# Patient Record
Sex: MALE | Race: WHITE | Employment: FULL TIME | URBAN - METROPOLITAN AREA
[De-identification: names, ages, dates, MRNs, and addresses within clinical notes are randomized per-mention and may not be internally consistent; named-entity substitution may affect disease eponyms.]

---

## 2017-08-14 ENCOUNTER — TELEPHONE (OUTPATIENT)
Dept: FAMILY MEDICINE CLINIC | Facility: CLINIC | Age: 57
End: 2017-08-14

## 2017-08-14 NOTE — TELEPHONE ENCOUNTER
C/o sore throat for last 4 weeks had sharp pain to Left side of throat that has resolved now has dizzy ness when he lays down and moves this got better but is still having dizzy ness given an appointment with Dr Leopoldo Mckinney for tomorrow

## 2017-08-15 ENCOUNTER — OFFICE VISIT (OUTPATIENT)
Dept: FAMILY MEDICINE CLINIC | Facility: CLINIC | Age: 57
End: 2017-08-15

## 2017-08-15 VITALS
BODY MASS INDEX: 27.44 KG/M2 | HEART RATE: 80 BPM | TEMPERATURE: 99 F | RESPIRATION RATE: 14 BRPM | HEIGHT: 75.75 IN | DIASTOLIC BLOOD PRESSURE: 70 MMHG | SYSTOLIC BLOOD PRESSURE: 130 MMHG | WEIGHT: 223 LBS

## 2017-08-15 DIAGNOSIS — H81.10 BPPV (BENIGN PAROXYSMAL POSITIONAL VERTIGO), UNSPECIFIED LATERALITY: Primary | ICD-10-CM

## 2017-08-15 PROCEDURE — 99213 OFFICE O/P EST LOW 20 MIN: CPT | Performed by: FAMILY MEDICINE

## 2017-08-15 NOTE — PROGRESS NOTES
Patient presents with:  Dizziness: x4 weeks w/dizziness especially in the mornin and with quick movements. Pt states had some naueous w/dizziness. HPI:   Tristen Ordonez is a 62year old male who presents to the office for dizziness 4 weeks.   Getting a

## 2018-01-25 ENCOUNTER — TELEPHONE (OUTPATIENT)
Dept: FAMILY MEDICINE CLINIC | Facility: CLINIC | Age: 58
End: 2018-01-25

## 2018-01-25 DIAGNOSIS — Z13.220 LIPID SCREENING: Primary | ICD-10-CM

## 2018-01-25 DIAGNOSIS — Z12.5 PROSTATE CANCER SCREENING: ICD-10-CM

## 2018-01-25 NOTE — TELEPHONE ENCOUNTER
Pt made physical appt Jan 31st and needs blood work order sent to [address] [place] [address].

## 2018-01-28 LAB
ALBUMIN/GLOBULIN RATIO: 1.5 (CALC) (ref 1–2.5)
ALBUMIN: 4.2 G/DL (ref 3.6–5.1)
ALKALINE PHOSPHATASE: 46 U/L (ref 40–115)
ALT: 17 U/L (ref 9–46)
AST: 21 U/L (ref 10–35)
BILIRUBIN, TOTAL: 0.7 MG/DL (ref 0.2–1.2)
BUN: 15 MG/DL (ref 7–25)
CALCIUM: 9.4 MG/DL (ref 8.6–10.3)
CARBON DIOXIDE: 30 MMOL/L (ref 20–31)
CHLORIDE: 102 MMOL/L (ref 98–110)
CHOL/HDLC RATIO: 3.1 (CALC)
CHOLESTEROL, TOTAL: 219 MG/DL
CREATININE: 1.21 MG/DL (ref 0.7–1.33)
EGFR IF AFRICN AM: 77 ML/MIN/1.73M2
EGFR IF NONAFRICN AM: 66 ML/MIN/1.73M2
GLOBULIN: 2.8 G/DL (CALC) (ref 1.9–3.7)
GLUCOSE: 94 MG/DL (ref 65–99)
HDL CHOLESTEROL: 70 MG/DL
LDL-CHOLESTEROL: 134 MG/DL (CALC)
NON-HDL CHOLESTEROL: 149 MG/DL (CALC)
POTASSIUM: 4.5 MMOL/L (ref 3.5–5.3)
PROTEIN, TOTAL: 7 G/DL (ref 6.1–8.1)
PSA, TOTAL: 2.1 NG/ML
SODIUM: 138 MMOL/L (ref 135–146)
TRIGLYCERIDES: 60 MG/DL

## 2018-01-30 ENCOUNTER — OFFICE VISIT (OUTPATIENT)
Dept: FAMILY MEDICINE CLINIC | Facility: CLINIC | Age: 58
End: 2018-01-30

## 2018-01-30 VITALS
DIASTOLIC BLOOD PRESSURE: 70 MMHG | BODY MASS INDEX: 26.89 KG/M2 | HEART RATE: 72 BPM | RESPIRATION RATE: 16 BRPM | HEIGHT: 76 IN | WEIGHT: 220.81 LBS | SYSTOLIC BLOOD PRESSURE: 108 MMHG | TEMPERATURE: 98 F

## 2018-01-30 DIAGNOSIS — S00.93XS CONTUSION OF HEAD, UNSPECIFIED PART OF HEAD, SEQUELA: ICD-10-CM

## 2018-01-30 DIAGNOSIS — H81.10 BPPV (BENIGN PAROXYSMAL POSITIONAL VERTIGO), UNSPECIFIED LATERALITY: ICD-10-CM

## 2018-01-30 DIAGNOSIS — Z00.00 ANNUAL PHYSICAL EXAM: Primary | ICD-10-CM

## 2018-01-30 PROCEDURE — 99396 PREV VISIT EST AGE 40-64: CPT | Performed by: FAMILY MEDICINE

## 2018-01-30 RX ORDER — CHLORAL HYDRATE 500 MG
1 CAPSULE ORAL DAILY
COMMUNITY
End: 2021-03-10

## 2018-01-30 RX ORDER — MULTIVIT-MIN/IRON FUM/FOLIC AC 7.5 MG-4
1 TABLET ORAL DAILY
COMMUNITY
End: 2021-03-10

## 2018-01-30 NOTE — PROGRESS NOTES
Patient presents with:  Physical: had neck injury - tripped and hit head on wall right after Thanksgiving      HPI:   Jon Clayton is a 62year old male who presents for a complete physical exam.     Last colonoscopy:  2013. Due again 2023.     Last PSA ([facility].) 12/3/2013   • Squamous cell carcinoma 7/13    right forearm      Past Surgical History:  3/18/2013: COLONOSCOPY      Comment: diverticulosis   Family History   Problem Relation Age of Onset   • Pacemaker Father    • Ear Problems Father    • Ear Probl and symmetric  Neurologic: Alert and oriented X 3, normal strength and tone. Normal symmetric reflexes.  Normal coordination and gait      ASSESSMENT AND PLAN:     Tony Lopez was seen in the office today:  had concerns including Physical (had neck inju

## 2019-03-25 ENCOUNTER — TELEPHONE (OUTPATIENT)
Dept: FAMILY MEDICINE CLINIC | Facility: CLINIC | Age: 59
End: 2019-03-25

## 2019-03-25 DIAGNOSIS — Z13.228 SCREENING FOR METABOLIC DISORDER: ICD-10-CM

## 2019-03-25 DIAGNOSIS — Z12.5 SCREENING FOR PROSTATE CANCER: ICD-10-CM

## 2019-03-25 DIAGNOSIS — Z13.220 SCREENING CHOLESTEROL LEVEL: ICD-10-CM

## 2019-03-25 DIAGNOSIS — Z00.00 BLOOD TESTS FOR ROUTINE GENERAL PHYSICAL EXAMINATION: Primary | ICD-10-CM

## 2019-03-25 NOTE — TELEPHONE ENCOUNTER
Please enter lab orders for the patient's upcoming physical appointment. Physical scheduled:    Your appointments     Date & Time Appointment Department [facility])    Apr 10, 2019  7:30 AM CDT Physical - Established Patient with MD Christina Josue

## 2019-04-10 ENCOUNTER — OFFICE VISIT (OUTPATIENT)
Dept: FAMILY MEDICINE CLINIC | Facility: CLINIC | Age: 59
End: 2019-04-10
Payer: COMMERCIAL

## 2019-04-10 VITALS
TEMPERATURE: 98 F | BODY MASS INDEX: 27.39 KG/M2 | SYSTOLIC BLOOD PRESSURE: 114 MMHG | HEIGHT: 75.75 IN | HEART RATE: 72 BPM | WEIGHT: 222.63 LBS | DIASTOLIC BLOOD PRESSURE: 78 MMHG | RESPIRATION RATE: 16 BRPM

## 2019-04-10 DIAGNOSIS — Z00.00 ANNUAL PHYSICAL EXAM: Primary | ICD-10-CM

## 2019-04-10 DIAGNOSIS — R29.898 NECK TIGHTNESS: ICD-10-CM

## 2019-04-10 PROCEDURE — 99396 PREV VISIT EST AGE 40-64: CPT | Performed by: FAMILY MEDICINE

## 2019-04-10 NOTE — PROGRESS NOTES
Patient presents with:  Physical: annual exam and review recent labs  Neck Pain: Still having pain after a fall a year ago     HPI:   Rosana Burleson is a 62year old male who presents for a complete physical exam.     Last colonoscopy:  3/2013.   Due again upper back   • Melanoma ([facility] 11/12    .42mm mid back   • Melanoma in situ of back ([facility] 12/3/2013   • Squamous cell carcinoma 7/13    right forearm      Past Surgical History:   Procedure Laterality Date   • COLONOSCOPY  3/18/2013    diverticulosis      F soft, non-tender; bowel sounds normal; no masses,  no organomegaly  Extremities: extremities normal, atraumatic, no cyanosis or edema  Pulses: 2+ and symmetric  Neurologic: Alert and oriented X 3, normal strength and tone. Normal symmetric reflexes.  Normal

## 2019-11-17 ENCOUNTER — WALK IN (OUTPATIENT)
Dept: URGENT CARE | Age: 59
End: 2019-11-17

## 2019-11-17 DIAGNOSIS — Z23 INFLUENZA VACCINATION ADMINISTERED AT CURRENT VISIT: Primary | ICD-10-CM

## 2019-11-17 PROCEDURE — 90471 IMMUNIZATION ADMIN: CPT | Performed by: NURSE PRACTITIONER

## 2019-11-17 PROCEDURE — 90686 IIV4 VACC NO PRSV 0.5 ML IM: CPT | Performed by: NURSE PRACTITIONER

## 2019-12-16 ENCOUNTER — TELEPHONE (OUTPATIENT)
Dept: FAMILY MEDICINE CLINIC | Facility: CLINIC | Age: 59
End: 2019-12-16

## 2019-12-16 NOTE — TELEPHONE ENCOUNTER
FYI: Patient called to schedule appointment with Dr Emma Tate, states thinks has heart arhythmia, offered another provider to get in sooner, patient declined due to his availability, scheduled for 12/30 with Dr Emma Tate.

## 2019-12-17 ENCOUNTER — HOSPITAL ENCOUNTER (EMERGENCY)
Facility: HOSPITAL | Age: 59
Discharge: HOME | End: 2019-12-17
Attending: EMERGENCY MEDICINE
Payer: COMMERCIAL

## 2019-12-17 ENCOUNTER — APPOINTMENT (OUTPATIENT)
Dept: GENERAL RADIOLOGY | Facility: HOSPITAL | Age: 59
End: 2019-12-17
Attending: EMERGENCY MEDICINE
Payer: COMMERCIAL

## 2019-12-17 VITALS
RESPIRATION RATE: 16 BRPM | HEIGHT: 76 IN | HEART RATE: 59 BPM | DIASTOLIC BLOOD PRESSURE: 82 MMHG | WEIGHT: 215 LBS | OXYGEN SATURATION: 98 % | BODY MASS INDEX: 26.18 KG/M2 | SYSTOLIC BLOOD PRESSURE: 145 MMHG | TEMPERATURE: 97 F

## 2019-12-17 DIAGNOSIS — R00.2 PALPITATIONS: Primary | ICD-10-CM

## 2019-12-17 PROCEDURE — 96360 HYDRATION IV INFUSION INIT: CPT

## 2019-12-17 PROCEDURE — 85610 PROTHROMBIN TIME: CPT | Performed by: EMERGENCY MEDICINE

## 2019-12-17 PROCEDURE — 71045 X-RAY EXAM CHEST 1 VIEW: CPT | Performed by: EMERGENCY MEDICINE

## 2019-12-17 PROCEDURE — 99285 EMERGENCY DEPT VISIT HI MDM: CPT

## 2019-12-17 PROCEDURE — 93005 ELECTROCARDIOGRAM TRACING: CPT

## 2019-12-17 PROCEDURE — 84443 ASSAY THYROID STIM HORMONE: CPT | Performed by: EMERGENCY MEDICINE

## 2019-12-17 PROCEDURE — 96361 HYDRATE IV INFUSION ADD-ON: CPT

## 2019-12-17 PROCEDURE — 85025 COMPLETE CBC W/AUTO DIFF WBC: CPT | Performed by: EMERGENCY MEDICINE

## 2019-12-17 PROCEDURE — 80053 COMPREHEN METABOLIC PANEL: CPT | Performed by: EMERGENCY MEDICINE

## 2019-12-17 PROCEDURE — 93010 ELECTROCARDIOGRAM REPORT: CPT

## 2019-12-17 PROCEDURE — 85730 THROMBOPLASTIN TIME PARTIAL: CPT | Performed by: EMERGENCY MEDICINE

## 2019-12-17 PROCEDURE — 84484 ASSAY OF TROPONIN QUANT: CPT | Performed by: EMERGENCY MEDICINE

## 2019-12-17 RX ORDER — METOPROLOL SUCCINATE 25 MG/1
25 TABLET, EXTENDED RELEASE ORAL DAILY
Qty: 14 TABLET | Refills: 0 | Status: SHIPPED | OUTPATIENT
Start: 2019-12-17 | End: 2019-12-20

## 2019-12-17 NOTE — TELEPHONE ENCOUNTER
Patient called back started in [facility] Oct for this has not seen anybody for this not constant chest is pounding at times had heaviness notices it at night when getting up to go to bathroom has hard beat to go along with regular beat can last up to 1 min gets

## 2019-12-18 ENCOUNTER — OFFICE VISIT (OUTPATIENT)
Dept: FAMILY MEDICINE CLINIC | Facility: CLINIC | Age: 59
End: 2019-12-18
Payer: COMMERCIAL

## 2019-12-18 VITALS
BODY MASS INDEX: 27.16 KG/M2 | HEIGHT: 76 IN | DIASTOLIC BLOOD PRESSURE: 74 MMHG | HEART RATE: 64 BPM | WEIGHT: 223 LBS | SYSTOLIC BLOOD PRESSURE: 126 MMHG | RESPIRATION RATE: 16 BRPM | TEMPERATURE: 98 F

## 2019-12-18 DIAGNOSIS — I49.1 PAC (PREMATURE ATRIAL CONTRACTION): Primary | ICD-10-CM

## 2019-12-18 PROCEDURE — 99214 OFFICE O/P EST MOD 30 MIN: CPT | Performed by: FAMILY MEDICINE

## 2019-12-18 PROCEDURE — 93000 ELECTROCARDIOGRAM COMPLETE: CPT | Performed by: FAMILY MEDICINE

## 2019-12-18 NOTE — PROGRESS NOTES
Patient presents with:  ER F/U: Pt was seen in ER on 12/17/19 for palpitations     HPI:   Thu Wright is a 61year old male who presents to the office for eval of palpitations. Was having these for a few months. Discussed with Fredis.   He looked including caffeine, stress, lack of sleep, we discussed his recent labs work and lack of anemia, thyroid, kidney disease as good signs. Would recommend he start the metoprolol to see if it does help. Discussed SE.   Plan to see EP specialist in future  Wh

## 2019-12-18 NOTE — ED PROVIDER NOTES
Patient Seen in: [facility] Emergency Department      History   Patient presents with:  Arrythmia/Palpitations    Stated Complaint: palpitations    HPI    Martitakelly Cantu is a pleasant 49-year-old male coming with complaints of palpitations.   He has been ha components:       Result Value    Glucose 101 (*)     BUN 21 (*)     All other components within normal limits   TROPONIN I - Normal   PROTHROMBIN TIME (PT) - Normal   PTT, ACTIVATED - Normal   TSH W REFLEX TO FREE T4 - Normal   CBC WITH DIFFERENTIAL WITH these medications    Metoprolol Succinate ER 25 MG Oral Tablet 24 Hr  Take 1 tablet (25 mg total) by mouth daily for 14 days.   Qty: 14 tablet Refills: 0

## 2019-12-18 NOTE — ED INITIAL ASSESSMENT (HPI)
Patient to ER with complaint of palpations, reports \"I feel an extra heavy beat,\" without any chest pain or DAVID. Breathing is unlabored, even and regular at this time. Reports palpations X 8 weeks.

## 2019-12-18 NOTE — ED NOTES
Received patient from Kathe Dyson, [place]. Patient resting comfortably in stretcher at this time. He remains on continuous cardiac monitoring and pulse oximetry. NSR noted. Patient denies any pain or complaint at this time. Will continue to monitor.

## 2019-12-20 ENCOUNTER — TELEPHONE (OUTPATIENT)
Dept: CARDIOLOGY | Age: 59
End: 2019-12-20

## 2019-12-20 NOTE — TELEPHONE ENCOUNTER
Patient requesting refill on Metoprolol, will run out on New Year's and wants to make sure that he is covered.  Please send to [facility]

## 2019-12-20 NOTE — TELEPHONE ENCOUNTER
Left message on answering machine to call triage. LOV 12/18/19. Please ask pt if Metoprolol is helping his symptoms.

## 2019-12-21 RX ORDER — METOPROLOL SUCCINATE 25 MG/1
25 TABLET, EXTENDED RELEASE ORAL DAILY
Qty: 30 TABLET | Refills: 2 | Status: SHIPPED | OUTPATIENT
Start: 2019-12-21 | End: 2020-03-10

## 2020-01-17 ENCOUNTER — TELEPHONE (OUTPATIENT)
Dept: CARDIOLOGY | Age: 60
End: 2020-01-17

## 2020-01-20 ENCOUNTER — TELEPHONE (OUTPATIENT)
Dept: CARDIOLOGY | Age: 60
End: 2020-01-20

## 2020-01-20 RX ORDER — METOPROLOL SUCCINATE 25 MG/1
25 TABLET, EXTENDED RELEASE ORAL DAILY
Refills: 2 | COMMUNITY
Start: 2019-12-29 | End: 2021-08-18

## 2020-01-20 RX ORDER — MULTIVIT-MIN/IRON FUM/FOLIC AC 7.5 MG-4
1 TABLET ORAL DAILY
COMMUNITY
End: 2020-08-12 | Stop reason: ALTCHOICE

## 2020-01-20 RX ORDER — CHLORAL HYDRATE 500 MG
1 CAPSULE ORAL DAILY
COMMUNITY
End: 2020-08-12 | Stop reason: ALTCHOICE

## 2020-01-22 ENCOUNTER — OFFICE VISIT (OUTPATIENT)
Dept: CARDIOLOGY | Age: 60
End: 2020-01-22

## 2020-01-22 ENCOUNTER — APPOINTMENT (OUTPATIENT)
Dept: CARDIOLOGY | Age: 60
End: 2020-01-22

## 2020-01-22 VITALS
BODY MASS INDEX: 27.03 KG/M2 | HEIGHT: 76 IN | SYSTOLIC BLOOD PRESSURE: 122 MMHG | WEIGHT: 222 LBS | DIASTOLIC BLOOD PRESSURE: 74 MMHG

## 2020-01-22 DIAGNOSIS — R00.2 PALPITATIONS: Primary | ICD-10-CM

## 2020-01-22 PROCEDURE — 93000 ELECTROCARDIOGRAM COMPLETE: CPT | Performed by: INTERNAL MEDICINE

## 2020-01-22 PROCEDURE — 99215 OFFICE O/P EST HI 40 MIN: CPT | Performed by: INTERNAL MEDICINE

## 2020-01-22 SDOH — HEALTH STABILITY: PHYSICAL HEALTH: ON AVERAGE, HOW MANY DAYS PER WEEK DO YOU ENGAGE IN MODERATE TO STRENUOUS EXERCISE (LIKE A BRISK WALK)?: 3 DAYS

## 2020-01-22 SDOH — HEALTH STABILITY: PHYSICAL HEALTH: ON AVERAGE, HOW MANY MINUTES DO YOU ENGAGE IN EXERCISE AT THIS LEVEL?: 90 MIN

## 2020-01-22 ASSESSMENT — PATIENT HEALTH QUESTIONNAIRE - PHQ9
SUM OF ALL RESPONSES TO PHQ9 QUESTIONS 1 AND 2: 0
1. LITTLE INTEREST OR PLEASURE IN DOING THINGS: NOT AT ALL
SUM OF ALL RESPONSES TO PHQ9 QUESTIONS 1 AND 2: 0
2. FEELING DOWN, DEPRESSED OR HOPELESS: NOT AT ALL

## 2020-01-28 ENCOUNTER — ANCILLARY PROCEDURE (OUTPATIENT)
Dept: CARDIOLOGY | Age: 60
End: 2020-01-28
Attending: INTERNAL MEDICINE

## 2020-01-28 DIAGNOSIS — R00.2 PALPITATIONS: ICD-10-CM

## 2020-01-28 PROCEDURE — 93306 TTE W/DOPPLER COMPLETE: CPT | Performed by: INTERNAL MEDICINE

## 2020-01-29 ENCOUNTER — TELEPHONE (OUTPATIENT)
Dept: CARDIOLOGY | Age: 60
End: 2020-01-29

## 2020-02-28 ENCOUNTER — TELEPHONE (OUTPATIENT)
Dept: FAMILY MEDICINE CLINIC | Facility: CLINIC | Age: 60
End: 2020-02-28

## 2020-02-28 DIAGNOSIS — Z13.0 SCREENING FOR BLOOD DISEASE: Primary | ICD-10-CM

## 2020-02-28 DIAGNOSIS — Z13.220 SCREENING FOR LIPID DISORDERS: ICD-10-CM

## 2020-02-28 DIAGNOSIS — Z13.228 SCREENING FOR METABOLIC DISORDER: ICD-10-CM

## 2020-02-28 DIAGNOSIS — Z13.29 SCREENING FOR THYROID DISORDER: ICD-10-CM

## 2020-02-28 DIAGNOSIS — Z12.5 SCREENING FOR PROSTATE CANCER: ICD-10-CM

## 2020-02-28 NOTE — TELEPHONE ENCOUNTER
Please enter lab orders for the patient's upcoming physical appointment. Physical scheduled:    Your appointments     Date & Time Appointment Department [facility])    Mar 10, 2020  7:30 AM CDT Physical - Established with Kyara Bhardwaj  [facility]

## 2020-02-28 NOTE — TELEPHONE ENCOUNTER
LM for patient to let him know his fasting orders, 10-12 hours, water only have been entered to [facility] and to have them prior to appt if possible.

## 2020-03-06 LAB
ABSOLUTE BASOPHILS: 80 CELLS/UL (ref 0–200)
ABSOLUTE EOSINOPHILS: 132 CELLS/UL (ref 15–500)
ABSOLUTE LYMPHOCYTES: 1833 CELLS/UL (ref 850–3900)
ABSOLUTE MONOCYTES: 353 CELLS/UL (ref 200–950)
ABSOLUTE NEUTROPHILS: 2303 CELLS/UL (ref 1500–7800)
ALBUMIN/GLOBULIN RATIO: 1.8 (CALC) (ref 1–2.5)
ALBUMIN: 4.3 G/DL (ref 3.6–5.1)
ALKALINE PHOSPHATASE: 40 U/L (ref 35–144)
ALT: 14 U/L (ref 9–46)
AST: 18 U/L (ref 10–35)
BASOPHILS: 1.7 %
BILIRUBIN, TOTAL: 0.8 MG/DL (ref 0.2–1.2)
BUN: 21 MG/DL (ref 7–25)
CALCIUM: 9.7 MG/DL (ref 8.6–10.3)
CARBON DIOXIDE: 33 MMOL/L (ref 20–32)
CHLORIDE: 101 MMOL/L (ref 98–110)
CHOL/HDLC RATIO: 3.7 (CALC)
CHOLESTEROL, TOTAL: 223 MG/DL
CREATININE: 1.1 MG/DL (ref 0.7–1.33)
EGFR IF AFRICN AM: 85 ML/MIN/1.73M2
EGFR IF NONAFRICN AM: 73 ML/MIN/1.73M2
EOSINOPHILS: 2.8 %
GLOBULIN: 2.4 G/DL (CALC) (ref 1.9–3.7)
GLUCOSE: 87 MG/DL (ref 65–99)
HDL CHOLESTEROL: 60 MG/DL
HEMATOCRIT: 43.2 % (ref 38.5–50)
HEMOGLOBIN: 15 G/DL (ref 13.2–17.1)
LDL-CHOLESTEROL: 144 MG/DL (CALC)
LYMPHOCYTES: 39 %
MCH: 31.5 PG (ref 27–33)
MCHC: 34.7 G/DL (ref 32–36)
MCV: 90.8 FL (ref 80–100)
MONOCYTES: 7.5 %
MPV: 10.3 FL (ref 7.5–12.5)
NEUTROPHILS: 49 %
NON-HDL CHOLESTEROL: 163 MG/DL (CALC)
PLATELET COUNT: 216 THOUSAND/UL (ref 140–400)
POTASSIUM: 5.2 MMOL/L (ref 3.5–5.3)
PROTEIN, TOTAL: 6.7 G/DL (ref 6.1–8.1)
PSA, TOTAL: 3.5 NG/ML
RDW: 12.6 % (ref 11–15)
RED BLOOD CELL COUNT: 4.76 MILLION/UL (ref 4.2–5.8)
SODIUM: 139 MMOL/L (ref 135–146)
TRIGLYCERIDES: 83 MG/DL
TSH W/REFLEX TO FT4: 0.88 MIU/L (ref 0.4–4.5)
WHITE BLOOD CELL COUNT: 4.7 THOUSAND/UL (ref 3.8–10.8)

## 2020-03-10 ENCOUNTER — OFFICE VISIT (OUTPATIENT)
Dept: FAMILY MEDICINE CLINIC | Facility: CLINIC | Age: 60
End: 2020-03-10
Payer: COMMERCIAL

## 2020-03-10 VITALS
HEART RATE: 56 BPM | HEIGHT: 76 IN | DIASTOLIC BLOOD PRESSURE: 70 MMHG | TEMPERATURE: 98 F | WEIGHT: 221 LBS | RESPIRATION RATE: 16 BRPM | BODY MASS INDEX: 26.91 KG/M2 | SYSTOLIC BLOOD PRESSURE: 122 MMHG

## 2020-03-10 DIAGNOSIS — I49.1 PAC (PREMATURE ATRIAL CONTRACTION): ICD-10-CM

## 2020-03-10 DIAGNOSIS — Z00.00 ANNUAL PHYSICAL EXAM: Primary | ICD-10-CM

## 2020-03-10 PROCEDURE — 99396 PREV VISIT EST AGE 40-64: CPT | Performed by: FAMILY MEDICINE

## 2020-03-10 RX ORDER — METOPROLOL SUCCINATE 25 MG/1
25 TABLET, EXTENDED RELEASE ORAL DAILY
Qty: 90 TABLET | Refills: 3 | Status: SHIPPED | OUTPATIENT
Start: 2020-03-10 | End: 2021-03-10

## 2020-03-10 RX ORDER — METRONIDAZOLE 500 MG/1
500 TABLET ORAL 3 TIMES DAILY
Qty: 21 TABLET | Refills: 0 | Status: SHIPPED | OUTPATIENT
Start: 2020-03-10 | End: 2021-03-10

## 2020-03-10 NOTE — PROGRESS NOTES
Patient presents with: Well Adult: Annual physical, labs completed     HPI:   Lesly Lazcano is a 61year old male who presents for a complete physical exam.     Last colonoscopy:  UTD.  2/18/2013. Repeat 10 yrs - 2023. Last PSA:  3.5, up some.   Susan Tejada forearm      Past Surgical History:   Procedure Laterality Date   • COLONOSCOPY  3/18/2013    diverticulosis      Family History   Problem Relation Age of Onset   • Pacemaker Father    • Ear Problems Father    • Ear Problems Brother    • Ear Problems Mater Normal coordination and gait     ASSESSMENT AND PLAN:     Yovanny Roche was seen in the office today:  had concerns including Well Adult (Annual physical, labs completed). 1. Annual physical exam  Overall well  Planning to do 3-mo hiking trip.   Given

## 2020-07-29 ENCOUNTER — APPOINTMENT (OUTPATIENT)
Dept: CARDIOLOGY | Age: 60
End: 2020-07-29

## 2020-08-12 ENCOUNTER — OFFICE VISIT (OUTPATIENT)
Dept: CARDIOLOGY | Age: 60
End: 2020-08-12

## 2020-08-12 VITALS
HEART RATE: 68 BPM | HEIGHT: 76 IN | SYSTOLIC BLOOD PRESSURE: 108 MMHG | WEIGHT: 228 LBS | DIASTOLIC BLOOD PRESSURE: 72 MMHG | BODY MASS INDEX: 27.76 KG/M2

## 2020-08-12 DIAGNOSIS — I49.1 ATRIAL ECTOPY: Primary | ICD-10-CM

## 2020-08-12 PROCEDURE — 99215 OFFICE O/P EST HI 40 MIN: CPT | Performed by: INTERNAL MEDICINE

## 2020-08-12 SDOH — HEALTH STABILITY: PHYSICAL HEALTH: ON AVERAGE, HOW MANY DAYS PER WEEK DO YOU ENGAGE IN MODERATE TO STRENUOUS EXERCISE (LIKE A BRISK WALK)?: 3 DAYS

## 2020-08-12 SDOH — HEALTH STABILITY: PHYSICAL HEALTH: ON AVERAGE, HOW MANY MINUTES DO YOU ENGAGE IN EXERCISE AT THIS LEVEL?: 90 MIN

## 2020-08-12 ASSESSMENT — PATIENT HEALTH QUESTIONNAIRE - PHQ9
SUM OF ALL RESPONSES TO PHQ9 QUESTIONS 1 AND 2: 0
2. FEELING DOWN, DEPRESSED OR HOPELESS: NOT AT ALL
1. LITTLE INTEREST OR PLEASURE IN DOING THINGS: NOT AT ALL
CLINICAL INTERPRETATION OF PHQ2 SCORE: NO FURTHER SCREENING NEEDED
CLINICAL INTERPRETATION OF PHQ9 SCORE: NO FURTHER SCREENING NEEDED
SUM OF ALL RESPONSES TO PHQ9 QUESTIONS 1 AND 2: 0

## 2021-02-10 ENCOUNTER — TELEPHONE (OUTPATIENT)
Dept: FAMILY MEDICINE CLINIC | Facility: CLINIC | Age: 61
End: 2021-02-10

## 2021-02-10 DIAGNOSIS — N40.1 BPH ASSOCIATED WITH NOCTURIA: Primary | ICD-10-CM

## 2021-02-10 DIAGNOSIS — Z13.220 LIPID SCREENING: ICD-10-CM

## 2021-02-10 DIAGNOSIS — Z12.5 SCREENING FOR MALIGNANT NEOPLASM OF PROSTATE: ICD-10-CM

## 2021-02-10 DIAGNOSIS — R35.1 BPH ASSOCIATED WITH NOCTURIA: Primary | ICD-10-CM

## 2021-02-10 DIAGNOSIS — Z13.0 SCREENING FOR BLOOD DISEASE: ICD-10-CM

## 2021-02-10 NOTE — TELEPHONE ENCOUNTER
Labs have been ordered. Please get them fasting prior to the appt.   thanks  [facility]

## 2021-02-10 NOTE — TELEPHONE ENCOUNTER
Please enter lab orders for the patient's upcoming physical appointment. Physical scheduled:    Your appointments     Date & Time Appointment Department [facility])    Mar 10, 2021  8:30 AM CST Physical - Established with Gabrielle Avendano MD [address] [place] [address]

## 2021-03-05 LAB
ABSOLUTE BASOPHILS: 78 CELLS/UL (ref 0–200)
ABSOLUTE EOSINOPHILS: 207 CELLS/UL (ref 15–500)
ABSOLUTE LYMPHOCYTES: 1923 CELLS/UL (ref 850–3900)
ABSOLUTE MONOCYTES: 419 CELLS/UL (ref 200–950)
ABSOLUTE NEUTROPHILS: 1973 CELLS/UL (ref 1500–7800)
ALBUMIN/GLOBULIN RATIO: 1.7 (CALC) (ref 1–2.5)
ALBUMIN: 4.2 G/DL (ref 3.6–5.1)
ALKALINE PHOSPHATASE: 49 U/L (ref 35–144)
ALT: 15 U/L (ref 9–46)
AST: 19 U/L (ref 10–35)
BASOPHILS: 1.7 %
BILIRUBIN, TOTAL: 0.7 MG/DL (ref 0.2–1.2)
BUN: 15 MG/DL (ref 7–25)
CALCIUM: 9.7 MG/DL (ref 8.6–10.3)
CARBON DIOXIDE: 31 MMOL/L (ref 20–32)
CHLORIDE: 103 MMOL/L (ref 98–110)
CHOL/HDLC RATIO: 3.9 (CALC)
CHOLESTEROL, TOTAL: 233 MG/DL
CREATININE: 1.13 MG/DL (ref 0.7–1.25)
EGFR IF AFRICN AM: 81 ML/MIN/1.73M2
EGFR IF NONAFRICN AM: 70 ML/MIN/1.73M2
EOSINOPHILS: 4.5 %
GLOBULIN: 2.5 G/DL (CALC) (ref 1.9–3.7)
GLUCOSE: 94 MG/DL (ref 65–99)
HDL CHOLESTEROL: 59 MG/DL
HEMATOCRIT: 43.2 % (ref 38.5–50)
HEMOGLOBIN: 14.7 G/DL (ref 13.2–17.1)
LDL-CHOLESTEROL: 152 MG/DL (CALC)
LYMPHOCYTES: 41.8 %
MCH: 30.9 PG (ref 27–33)
MCHC: 34 G/DL (ref 32–36)
MCV: 90.9 FL (ref 80–100)
MONOCYTES: 9.1 %
MPV: 10.7 FL (ref 7.5–12.5)
NEUTROPHILS: 42.9 %
NON-HDL CHOLESTEROL: 174 MG/DL (CALC)
PLATELET COUNT: 234 THOUSAND/UL (ref 140–400)
POTASSIUM: 4.4 MMOL/L (ref 3.5–5.3)
PROTEIN, TOTAL: 6.7 G/DL (ref 6.1–8.1)
RDW: 12.6 % (ref 11–15)
RED BLOOD CELL COUNT: 4.75 MILLION/UL (ref 4.2–5.8)
SODIUM: 139 MMOL/L (ref 135–146)
TOTAL PSA: 2.3 NG/ML
TRIGLYCERIDES: 107 MG/DL
WHITE BLOOD CELL COUNT: 4.6 THOUSAND/UL (ref 3.8–10.8)

## 2021-03-10 ENCOUNTER — OFFICE VISIT (OUTPATIENT)
Dept: FAMILY MEDICINE CLINIC | Facility: CLINIC | Age: 61
End: 2021-03-10
Payer: COMMERCIAL

## 2021-03-10 VITALS
HEART RATE: 64 BPM | WEIGHT: 233.38 LBS | HEIGHT: 76 IN | OXYGEN SATURATION: 98 % | SYSTOLIC BLOOD PRESSURE: 134 MMHG | DIASTOLIC BLOOD PRESSURE: 62 MMHG | RESPIRATION RATE: 16 BRPM | TEMPERATURE: 98 F | BODY MASS INDEX: 28.42 KG/M2

## 2021-03-10 DIAGNOSIS — M25.551 RIGHT HIP PAIN: ICD-10-CM

## 2021-03-10 DIAGNOSIS — Z00.00 ANNUAL PHYSICAL EXAM: ICD-10-CM

## 2021-03-10 DIAGNOSIS — E78.5 HYPERLIPIDEMIA LDL GOAL <130: Primary | ICD-10-CM

## 2021-03-10 PROCEDURE — 99396 PREV VISIT EST AGE 40-64: CPT | Performed by: FAMILY MEDICINE

## 2021-03-10 PROCEDURE — 3075F SYST BP GE 130 - 139MM HG: CPT | Performed by: FAMILY MEDICINE

## 2021-03-10 PROCEDURE — 3078F DIAST BP <80 MM HG: CPT | Performed by: FAMILY MEDICINE

## 2021-03-10 PROCEDURE — 3008F BODY MASS INDEX DOCD: CPT | Performed by: FAMILY MEDICINE

## 2021-03-10 NOTE — PROGRESS NOTES
Patient presents with:  Physical: Annual  Hip Pain: Mobility  Erectile Dysfuntion: Urinary and Changes     HPI:   Karin Moulton is a 61year old male who presents for a complete physical exam.     Last colonoscopy:  2/18/2013.   Repeat 10 yrs - 2023  Last Actinic keratosis    • Cancer ([facility]    • Dysplastic nevus 4/15    right upper back   • Melanoma ([facility] 11/12    .42mm mid back   • Melanoma in situ of back ([facility] 12/3/2013   • Squamous cell carcinoma 7/13    right forearm      Past Surgical History:   Proce no masses,  no organomegaly  Extremities: extremities normal, atraumatic, no cyanosis or edema  Pulses: 2+ and symmetric  Neurologic: Alert and oriented X 3, normal strength and tone. Normal symmetric reflexes.  Normal coordination and gait   MSK: non tende

## 2021-08-18 ENCOUNTER — OFFICE VISIT (OUTPATIENT)
Dept: CARDIOLOGY | Age: 61
End: 2021-08-18

## 2021-08-18 VITALS
DIASTOLIC BLOOD PRESSURE: 74 MMHG | HEART RATE: 65 BPM | SYSTOLIC BLOOD PRESSURE: 116 MMHG | BODY MASS INDEX: 27.52 KG/M2 | HEIGHT: 76 IN | WEIGHT: 226 LBS

## 2021-08-18 DIAGNOSIS — I49.3 PVC (PREMATURE VENTRICULAR CONTRACTION): Primary | ICD-10-CM

## 2021-08-18 DIAGNOSIS — I49.3 PVC (PREMATURE VENTRICULAR CONTRACTION): ICD-10-CM

## 2021-08-18 PROCEDURE — 99215 OFFICE O/P EST HI 40 MIN: CPT | Performed by: INTERNAL MEDICINE

## 2021-08-18 PROCEDURE — 93000 ELECTROCARDIOGRAM COMPLETE: CPT | Performed by: INTERNAL MEDICINE

## 2021-08-18 ASSESSMENT — PATIENT HEALTH QUESTIONNAIRE - PHQ9
1. LITTLE INTEREST OR PLEASURE IN DOING THINGS: NOT AT ALL
2. FEELING DOWN, DEPRESSED OR HOPELESS: NOT AT ALL
CLINICAL INTERPRETATION OF PHQ9 SCORE: NO FURTHER SCREENING NEEDED
SUM OF ALL RESPONSES TO PHQ9 QUESTIONS 1 AND 2: 0
CLINICAL INTERPRETATION OF PHQ2 SCORE: NO FURTHER SCREENING NEEDED
SUM OF ALL RESPONSES TO PHQ9 QUESTIONS 1 AND 2: 0

## 2021-08-20 ENCOUNTER — TELEPHONE (OUTPATIENT)
Dept: FAMILY MEDICINE CLINIC | Facility: CLINIC | Age: 61
End: 2021-08-20

## 2021-08-20 NOTE — TELEPHONE ENCOUNTER
PT states R  eye has been bloodshot all week. Feels uncomfortable moving it around. Feels inflamed. Wants to know if he needs to see eye doctor or if he needs to see provider.

## 2022-08-24 ENCOUNTER — OFFICE VISIT (OUTPATIENT)
Dept: CARDIOLOGY | Age: 62
End: 2022-08-24

## 2022-08-24 VITALS
SYSTOLIC BLOOD PRESSURE: 132 MMHG | WEIGHT: 224 LBS | HEART RATE: 70 BPM | DIASTOLIC BLOOD PRESSURE: 82 MMHG | BODY MASS INDEX: 27.27 KG/M2

## 2022-08-24 DIAGNOSIS — I49.3 PVC (PREMATURE VENTRICULAR CONTRACTION): Primary | ICD-10-CM

## 2022-08-24 PROCEDURE — 99215 OFFICE O/P EST HI 40 MIN: CPT | Performed by: INTERNAL MEDICINE

## 2022-08-24 ASSESSMENT — PATIENT HEALTH QUESTIONNAIRE - PHQ9
1. LITTLE INTEREST OR PLEASURE IN DOING THINGS: NOT AT ALL
2. FEELING DOWN, DEPRESSED OR HOPELESS: NOT AT ALL
CLINICAL INTERPRETATION OF PHQ2 SCORE: NO FURTHER SCREENING NEEDED
SUM OF ALL RESPONSES TO PHQ9 QUESTIONS 1 AND 2: 0
SUM OF ALL RESPONSES TO PHQ9 QUESTIONS 1 AND 2: 0

## 2022-09-02 ENCOUNTER — TELEPHONE (OUTPATIENT)
Dept: FAMILY MEDICINE CLINIC | Facility: CLINIC | Age: 62
End: 2022-09-02

## 2022-09-02 DIAGNOSIS — R35.1 BPH ASSOCIATED WITH NOCTURIA: ICD-10-CM

## 2022-09-02 DIAGNOSIS — Z12.5 SCREENING FOR MALIGNANT NEOPLASM OF PROSTATE: ICD-10-CM

## 2022-09-02 DIAGNOSIS — E78.5 HYPERLIPIDEMIA LDL GOAL <130: Primary | ICD-10-CM

## 2022-09-02 DIAGNOSIS — N40.1 BPH ASSOCIATED WITH NOCTURIA: ICD-10-CM

## 2022-09-02 NOTE — TELEPHONE ENCOUNTER
Please enter lab orders for the patient's upcoming physical appointment. Physical scheduled: Your appointments     Date & Time Appointment Department [facility])    Sep 12, 2022  7:30 AM CDT Physical - Established with MD [facility] [employer] [address], [place]  ([address])            [place] [address] [id number]         Preferred lab: [facility]     The patient has been notified to complete fasting labs prior to their physical appointment.

## 2022-09-09 LAB
ALBUMIN/GLOBULIN RATIO: 1.5 (CALC) (ref 1–2.5)
ALBUMIN: 4.3 G/DL (ref 3.6–5.1)
ALKALINE PHOSPHATASE: 46 U/L (ref 35–144)
ALT: 15 U/L (ref 9–46)
AST: 20 U/L (ref 10–35)
BILIRUBIN, TOTAL: 0.7 MG/DL (ref 0.2–1.2)
BUN: 18 MG/DL (ref 7–25)
CALCIUM: 9.6 MG/DL (ref 8.6–10.3)
CARBON DIOXIDE: 30 MMOL/L (ref 20–32)
CHLORIDE: 103 MMOL/L (ref 98–110)
CHOL/HDLC RATIO: 3.4 (CALC)
CHOLESTEROL, TOTAL: 223 MG/DL
CREATININE: 1.22 MG/DL (ref 0.7–1.35)
EGFR: 67 ML/MIN/1.73M2
GLOBULIN: 2.8 G/DL (CALC) (ref 1.9–3.7)
GLUCOSE: 89 MG/DL (ref 65–99)
HDL CHOLESTEROL: 65 MG/DL
LDL-CHOLESTEROL: 139 MG/DL (CALC)
NON-HDL CHOLESTEROL: 158 MG/DL (CALC)
POTASSIUM: 4.7 MMOL/L (ref 3.5–5.3)
PROTEIN, TOTAL: 7.1 G/DL (ref 6.1–8.1)
SODIUM: 139 MMOL/L (ref 135–146)
TOTAL PSA: 4 NG/ML
TRIGLYCERIDES: 91 MG/DL

## 2022-09-12 ENCOUNTER — OFFICE VISIT (OUTPATIENT)
Dept: FAMILY MEDICINE CLINIC | Facility: CLINIC | Age: 62
End: 2022-09-12
Payer: COMMERCIAL

## 2022-09-12 VITALS
RESPIRATION RATE: 16 BRPM | OXYGEN SATURATION: 98 % | SYSTOLIC BLOOD PRESSURE: 122 MMHG | TEMPERATURE: 99 F | WEIGHT: 223 LBS | HEART RATE: 80 BPM | HEIGHT: 75.75 IN | BODY MASS INDEX: 27.44 KG/M2 | DIASTOLIC BLOOD PRESSURE: 72 MMHG

## 2022-09-12 DIAGNOSIS — E78.5 HYPERLIPIDEMIA LDL GOAL <130: ICD-10-CM

## 2022-09-12 DIAGNOSIS — R97.20 ELEVATED PSA, LESS THAN 10 NG/ML: ICD-10-CM

## 2022-09-12 DIAGNOSIS — J34.89 SINUS PRESSURE: ICD-10-CM

## 2022-09-12 DIAGNOSIS — Z00.00 ANNUAL PHYSICAL EXAM: Primary | ICD-10-CM

## 2022-09-12 PROCEDURE — 3078F DIAST BP <80 MM HG: CPT | Performed by: FAMILY MEDICINE

## 2022-09-12 PROCEDURE — 99396 PREV VISIT EST AGE 40-64: CPT | Performed by: FAMILY MEDICINE

## 2022-09-12 PROCEDURE — 3008F BODY MASS INDEX DOCD: CPT | Performed by: FAMILY MEDICINE

## 2022-09-12 PROCEDURE — 3074F SYST BP LT 130 MM HG: CPT | Performed by: FAMILY MEDICINE

## 2022-10-25 ENCOUNTER — TELEPHONE (OUTPATIENT)
Dept: FAMILY MEDICINE CLINIC | Facility: CLINIC | Age: 62
End: 2022-10-25

## 2022-10-25 RX ORDER — METHYLPREDNISOLONE 4 MG/1
TABLET ORAL
Qty: 21 EACH | Refills: 0 | Status: SHIPPED | OUTPATIENT
Start: 2022-10-25

## 2022-10-25 NOTE — TELEPHONE ENCOUNTER
We did discuss this. Sorry its not making more of a difference. What I would do now is keep it up, and we will add a steroid for a short burst to try to cut down the inflammation here and now  I will call into pharmacy.

## 2022-10-25 NOTE — TELEPHONE ENCOUNTER
Pt wanting to speak to clinical about ongoing sinus issues. Discussed with Dr. Glynn Leone at [facility]. Tried flonase but not helping. Please call back.

## 2022-10-27 ENCOUNTER — TELEPHONE (OUTPATIENT)
Dept: FAMILY MEDICINE CLINIC | Facility: CLINIC | Age: 62
End: 2022-10-27

## 2022-10-27 NOTE — TELEPHONE ENCOUNTER
Pt advised directions are on blister pack. He was able to locate them during our conversation. Patient has no further questions or concerns at this time.

## 2022-10-27 NOTE — TELEPHONE ENCOUNTER
Pt picked up his script from [facility] and no dosage directions on the medication please advise.     Methylprednisolone

## 2022-10-28 LAB — PSA, TOTAL: 3.46 NG/ML

## 2023-04-03 ENCOUNTER — TELEPHONE (OUTPATIENT)
Dept: FAMILY MEDICINE CLINIC | Facility: CLINIC | Age: 63
End: 2023-04-03

## 2023-04-03 DIAGNOSIS — Z12.11 ENCOUNTER FOR SCREENING COLONOSCOPY: Primary | ICD-10-CM

## 2023-04-03 NOTE — TELEPHONE ENCOUNTER
[address] EMERGENCY DEPT  [address] [facility] [address] [phone]    Work/School Note    Date: 10/27/2021    To Whom It May concern:    Brady Sanderson was seen and treated today in the emergency room by the following provider(s):  Attending Provider: Brenton Gallegos MD.      Brady Sanderson may return to work on 10/29/21    Sincerely, Patient is calling he wants a order for a colonoscopy he would like a call back to discuss

## 2023-04-26 PROBLEM — Z12.11 SPECIAL SCREENING FOR MALIGNANT NEOPLASM OF COLON: Status: ACTIVE | Noted: 2023-04-26

## 2023-04-26 PROBLEM — D12.0 BENIGN NEOPLASM OF CECUM: Status: ACTIVE | Noted: 2023-04-26

## 2023-08-22 ENCOUNTER — TELEPHONE (OUTPATIENT)
Dept: FAMILY MEDICINE CLINIC | Facility: CLINIC | Age: 63
End: 2023-08-22

## 2023-08-22 NOTE — TELEPHONE ENCOUNTER
Pt call because came Covid Positive today , cough, running nose , congestion   Pt request a nurse to call back for what to do .                [id number] - Tiffany Duvall, [address] [place] [address] [address], [phone], [address] [address] Phone: [phone] Fax: [phone] Hours: Not open 24 hours

## 2023-08-23 NOTE — TELEPHONE ENCOUNTER
Called and talked to patient told him of My Chart message with information and recommended medications.  Their symptoms started the 18th and are getting better they will continue to isolate and call back if not getting better

## 2024-03-25 ENCOUNTER — TELEPHONE (OUTPATIENT)
Dept: FAMILY MEDICINE CLINIC | Facility: CLINIC | Age: 64
End: 2024-03-25

## 2024-03-25 DIAGNOSIS — Z00.00 LABORATORY EXAM ORDERED AS PART OF ROUTINE GENERAL MEDICAL EXAMINATION: Primary | ICD-10-CM

## 2024-03-25 NOTE — TELEPHONE ENCOUNTER
Future Appointments     Date Time Provider Department Center   4/24/2024  7:30 AM Sven Farrell MD [facility]   5/6/2024  6:30 AM Leo Barron MD [facility]      Please order labs for upcoming Px.    Routed to Dr Farrell

## 2024-03-25 NOTE — TELEPHONE ENCOUNTER
Please enter lab orders for the patient's upcoming physical appointment.     Physical scheduled:   Your appointments       Date & Time Appointment Department ([place])    Apr 24, 2024  7:30 AM CDT Physical - Established with Sven Farrell MD [facility] ([facility])              [facility]  [address]  [facility] [address]  [phone]           Preferred lab: [facility]     The patient has been notified to complete fasting labs prior to their physical appointment.

## 2024-04-07 ENCOUNTER — HOSPITAL ENCOUNTER (OUTPATIENT)
Dept: CT IMAGING | Age: 64
Discharge: HOME | End: 2024-04-07
Attending: FAMILY MEDICINE

## 2024-04-07 DIAGNOSIS — Z13.6 SCREENING FOR HEART DISEASE: ICD-10-CM

## 2024-04-19 LAB
ALBUMIN/GLOBULIN RATIO: 1.5 (CALC) (ref 1–2.5)
ALBUMIN: 4 G/DL (ref 3.6–5.1)
ALKALINE PHOSPHATASE: 46 U/L (ref 35–144)
ALT: 13 U/L (ref 9–46)
AST: 19 U/L (ref 10–35)
BILIRUBIN, TOTAL: 0.6 MG/DL (ref 0.2–1.2)
BUN: 20 MG/DL (ref 7–25)
CALCIUM: 9.6 MG/DL (ref 8.6–10.3)
CARBON DIOXIDE: 29 MMOL/L (ref 20–32)
CHLORIDE: 103 MMOL/L (ref 98–110)
CHOL/HDLC RATIO: 3.3 (CALC)
CHOLESTEROL, TOTAL: 208 MG/DL
CREATININE: 1.07 MG/DL (ref 0.7–1.35)
EGFR: 78 ML/MIN/1.73M2
GLOBULIN: 2.6 G/DL (CALC) (ref 1.9–3.7)
GLUCOSE: 94 MG/DL (ref 65–99)
HDL CHOLESTEROL: 64 MG/DL
HEMATOCRIT: 42.7 % (ref 38.5–50)
HEMOGLOBIN A1C: 5.5 % OF TOTAL HGB
HEMOGLOBIN: 14.2 G/DL (ref 13.2–17.1)
LDL-CHOLESTEROL: 125 MG/DL (CALC)
MCH: 31.1 PG (ref 27–33)
MCHC: 33.3 G/DL (ref 32–36)
MCV: 93.6 FL (ref 80–100)
MPV: 10.5 FL (ref 7.5–12.5)
NON-HDL CHOLESTEROL: 144 MG/DL (CALC)
PLATELET COUNT: 215 THOUSAND/UL (ref 140–400)
POTASSIUM: 5 MMOL/L (ref 3.5–5.3)
PROTEIN, TOTAL: 6.6 G/DL (ref 6.1–8.1)
RDW: 12.8 % (ref 11–15)
RED BLOOD CELL COUNT: 4.56 MILLION/UL (ref 4.2–5.8)
SODIUM: 139 MMOL/L (ref 135–146)
TOTAL PSA: 3.1 NG/ML
TRIGLYCERIDES: 88 MG/DL
TSH W/REFLEX TO FT4: 1.61 MIU/L (ref 0.4–4.5)
WHITE BLOOD CELL COUNT: 3.9 THOUSAND/UL (ref 3.8–10.8)

## 2024-04-24 ENCOUNTER — OFFICE VISIT (OUTPATIENT)
Dept: FAMILY MEDICINE CLINIC | Facility: CLINIC | Age: 64
End: 2024-04-24
Payer: COMMERCIAL

## 2024-04-24 VITALS
SYSTOLIC BLOOD PRESSURE: 120 MMHG | RESPIRATION RATE: 13 BRPM | WEIGHT: 224 LBS | DIASTOLIC BLOOD PRESSURE: 68 MMHG | HEART RATE: 72 BPM | OXYGEN SATURATION: 98 % | BODY MASS INDEX: 27.56 KG/M2 | HEIGHT: 75.75 IN

## 2024-04-24 DIAGNOSIS — Z00.00 ANNUAL PHYSICAL EXAM: Primary | ICD-10-CM

## 2024-04-24 PROBLEM — D12.0 BENIGN NEOPLASM OF CECUM: Status: RESOLVED | Noted: 2023-04-26 | Resolved: 2024-04-24

## 2024-04-24 PROBLEM — Z12.11 SPECIAL SCREENING FOR MALIGNANT NEOPLASM OF COLON: Status: RESOLVED | Noted: 2023-04-26 | Resolved: 2024-04-24

## 2024-04-24 PROCEDURE — 99396 PREV VISIT EST AGE 40-64: CPT | Performed by: FAMILY MEDICINE

## 2024-04-24 PROCEDURE — 96127 BRIEF EMOTIONAL/BEHAV ASSMT: CPT | Performed by: FAMILY MEDICINE

## 2024-04-24 RX ORDER — CICLOPIROX 80 MG/ML
1 SOLUTION TOPICAL NIGHTLY
COMMUNITY
Start: 2024-04-02

## 2024-04-24 NOTE — PROGRESS NOTES
Chief Complaint   Patient presents with    Physical     Patient here for physical       HPI:   Fito Pichardo is a 63 year old male who presents for a complete physical exam.     Last colonoscopy:  4/2023.  Repeat 5 yrs  Last PSA:  3.1  Immunizations: TDaP 2015.      Wt Readings from Last 6 Encounters:   04/24/24 224 lb (101.6 kg)   04/19/23 227 lb (103 kg)   09/12/22 223 lb (101.2 kg)   03/10/21 233 lb 6.4 oz (105.9 kg)   03/10/20 221 lb (100.2 kg)   12/18/19 223 lb (101.2 kg)     Body mass index is 27.45 kg/m².     Chemistry Labs:   Lab Results   Component Value Date/Time    GLU 94 04/18/2024 07:53 AM     04/18/2024 07:53 AM    K 5.0 04/18/2024 07:53 AM     04/18/2024 07:53 AM    CO2 29 04/18/2024 07:53 AM    CREATSERUM 1.07 04/18/2024 07:53 AM    CA 9.6 04/18/2024 07:53 AM    ALB 4.0 04/18/2024 07:53 AM    TP 6.6 04/18/2024 07:53 AM    ALKPHO 46 04/18/2024 07:53 AM    AST 19 04/18/2024 07:53 AM    ALT 13 04/18/2024 07:53 AM    BILT 0.6 04/18/2024 07:53 AM          Cholesterol  (most recent labs)   Lab Results   Component Value Date/Time    CHOLEST 208 (H) 04/18/2024 07:53 AM    HDL 64 04/18/2024 07:53 AM     (H) 04/18/2024 07:53 AM    TRIG 88 04/18/2024 07:53 AM      No results found for: \"PSA\"      Current Outpatient Medications   Medication Sig Dispense Refill    Ciclopirox 8 % External Solution Apply 1 Application topically nightly.        Past Medical History:    Actinic keratosis    Cancer (HCC)    Dysplastic nevus    right upper back    Hearing loss    Hearing aids    Melanoma (HCC)    .42mm mid back    Melanoma in situ of back (HCC)    Squamous cell carcinoma    right forearm    Wears glasses    Reading      Past Surgical History:   Procedure Laterality Date    Colonoscopy  03/18/2013    diverticulosis    Colonoscopy        Family History   Problem Relation Age of Onset    Pacemaker Father     Ear Problems Father     Ear Problems Brother     Ear Problems Maternal Uncle     Allergies Neg      Cancer Neg     Bleeding Disorders Neg     Clotting Disorder Neg     Thyroid disease Neg     Hypertension Neg     Asthma Neg     Arthritis Neg     Diabetes Neg     Anesthesia Problems  Neg       Social History:  Social History     Socioeconomic History    Marital status:    Tobacco Use    Smoking status: Never    Smokeless tobacco: Never   Vaping Use    Vaping status: Never Used   Substance and Sexual Activity    Alcohol use: Yes     Comment: 2 glasses wine and 2 cans of beer once/twice a month    Drug use: No   Other Topics Concern    Caffeine Concern No     Comment: decaf    Exercise Yes     Comment: 2-3 times weekly     Seat Belt Yes     Social Determinants of Health     Physical Activity: Sufficiently Active (8/12/2020)    Received from [email or web address]    Exercise Vital Sign     Days of Exercise per Week: 3 days     Minutes of Exercise per Session: 90 min      Occ: IT.   : yes. Children: 2 - daughter 41, son 33.   Exercise: walking  Diet: balanced.     REVIEW OF SYSTEMS:     All systems reviewed, negative other than noted above.    EXAM:   /68   Pulse 72   Resp 13   Ht 6' 3.75\" (1.924 m)   Wt 224 lb (101.6 kg)   SpO2 98%   BMI 27.45 kg/m²   Body mass index is 27.45 kg/m².     General appearance: alert, appears stated age and cooperative  Eyes: conjunctivae/corneas clear. PERRL, EOM's intact.   Ears: normal TM's and external ear canals both ears  Neck: no adenopathy, no JVD, supple, symmetrical, trachea midline and thyroid not enlarged, symmetric, no tenderness/mass/nodules  Lungs: clear to auscultation bilaterally  Heart: S1, S2 normal, no murmur, click, rub or gallop, regular rate and rhythm  Abdomen: soft, non-tender; bowel sounds normal; no masses,  no organomegaly  Extremities: extremities normal, atraumatic, no cyanosis or edema  Pulses: 2+ and symmetric  Neurologic: Alert and oriented X 3, normal strength and tone. Normal symmetric reflexes.  Normal coordination and gait     ASSESSMENT AND PLAN:     Fito AN Montessuki was seen in the office today:  had concerns including Physical (Patient here for physical ).    1. Annual physical exam  Overall well  Continues to follow healthy diet, exercise.  Lipids improved  CACS shows no signs of plaque on the cardiac arteries  C-scope UTD  PSA reassuring.  Discussed immunizations.       Sven Farrell M.D.   [facility] 3  04/24/24

## 2025-03-28 ENCOUNTER — TELEPHONE (OUTPATIENT)
Dept: FAMILY MEDICINE CLINIC | Facility: CLINIC | Age: 65
End: 2025-03-28

## 2025-03-28 DIAGNOSIS — Z00.00 LABORATORY EXAM ORDERED AS PART OF ROUTINE GENERAL MEDICAL EXAMINATION: Primary | ICD-10-CM

## 2025-03-28 NOTE — TELEPHONE ENCOUNTER
Please enter lab orders for the patient's upcoming physical appointment.    Physical scheduled:   Your appointments       Date & Time Appointment Department ([place])    Apr 29, 2025 9:00 AM CDT Physical - Established with Sven Farrell MD [facility] ([facility])              [facility]  [address]  [facility] [address]  [phone]           Preferred lab: [facility]     The patient has been notified to complete fasting labs prior to their physical appointment.

## 2025-04-11 LAB
ABSOLUTE BASOPHILS: 92 CELLS/UL (ref 0–200)
ABSOLUTE EOSINOPHILS: 342 CELLS/UL (ref 15–500)
ABSOLUTE LYMPHOCYTES: 1882 CELLS/UL (ref 850–3900)
ABSOLUTE MONOCYTES: 423 CELLS/UL (ref 200–950)
ABSOLUTE NEUTROPHILS: 2361 CELLS/UL (ref 1500–7800)
ALBUMIN/GLOBULIN RATIO: 1.5 (CALC) (ref 1–2.5)
ALBUMIN: 4.1 G/DL (ref 3.6–5.1)
ALKALINE PHOSPHATASE: 50 U/L (ref 35–144)
ALT: 13 U/L (ref 9–46)
AST: 19 U/L (ref 10–35)
BASOPHILS: 1.8 %
BILIRUBIN, TOTAL: 0.6 MG/DL (ref 0.2–1.2)
BUN: 16 MG/DL (ref 7–25)
CALCIUM: 9.3 MG/DL (ref 8.6–10.3)
CARBON DIOXIDE: 31 MMOL/L (ref 20–32)
CHLORIDE: 103 MMOL/L (ref 98–110)
CHOL/HDLC RATIO: 3.2 (CALC)
CHOLESTEROL, TOTAL: 208 MG/DL
CREATININE: 0.99 MG/DL (ref 0.7–1.35)
EGFR: 85 ML/MIN/1.73M2
EOSINOPHILS: 6.7 %
GLOBULIN: 2.8 G/DL (CALC) (ref 1.9–3.7)
GLUCOSE: 90 MG/DL (ref 65–99)
HDL CHOLESTEROL: 66 MG/DL
HEMATOCRIT: 43.4 % (ref 38.5–50)
HEMOGLOBIN A1C: 5.5 % OF TOTAL HGB
HEMOGLOBIN: 14.2 G/DL (ref 13.2–17.1)
LDL-CHOLESTEROL: 125 MG/DL (CALC)
LYMPHOCYTES: 36.9 %
MCH: 31 PG (ref 27–33)
MCHC: 32.7 G/DL (ref 32–36)
MCV: 94.8 FL (ref 80–100)
MONOCYTES: 8.3 %
MPV: 10.6 FL (ref 7.5–12.5)
NEUTROPHILS: 46.3 %
NON-HDL CHOLESTEROL: 142 MG/DL (CALC)
PLATELET COUNT: 244 THOUSAND/UL (ref 140–400)
POTASSIUM: 4.2 MMOL/L (ref 3.5–5.3)
PROTEIN, TOTAL: 6.9 G/DL (ref 6.1–8.1)
PSA, TOTAL: 2.59 NG/ML
RDW: 12.8 % (ref 11–15)
RED BLOOD CELL COUNT: 4.58 MILLION/UL (ref 4.2–5.8)
SODIUM: 140 MMOL/L (ref 135–146)
TRIGLYCERIDES: 78 MG/DL
TSH W/REFLEX TO FT4: 1.36 MIU/L (ref 0.4–4.5)
WHITE BLOOD CELL COUNT: 5.1 THOUSAND/UL (ref 3.8–10.8)

## 2025-04-29 ENCOUNTER — OFFICE VISIT (OUTPATIENT)
Dept: FAMILY MEDICINE CLINIC | Facility: CLINIC | Age: 65
End: 2025-04-29
Payer: COMMERCIAL

## 2025-04-29 VITALS
SYSTOLIC BLOOD PRESSURE: 120 MMHG | BODY MASS INDEX: 25.84 KG/M2 | RESPIRATION RATE: 16 BRPM | WEIGHT: 210 LBS | OXYGEN SATURATION: 98 % | HEART RATE: 73 BPM | DIASTOLIC BLOOD PRESSURE: 68 MMHG | HEIGHT: 75.75 IN

## 2025-04-29 DIAGNOSIS — Z00.00 ANNUAL PHYSICAL EXAM: Primary | ICD-10-CM

## 2025-04-29 DIAGNOSIS — H53.9 CHANGE IN VISION: ICD-10-CM

## 2025-04-29 PROCEDURE — 99396 PREV VISIT EST AGE 40-64: CPT | Performed by: FAMILY MEDICINE

## 2025-04-29 NOTE — PROGRESS NOTES
Chief Complaint   Patient presents with    Well Adult     Patient here for physical       HPI:   Fito Pichardo is a 64 year old male who presents for a complete physical exam. Planning to move to [place] end of summer.  Closing/inspection on house this week, then in May.      Last colonoscopy:  4/2023.  Repeat 5 yrs.   Last PSA:  2.59.    Immunizations: TDaP 2025.  Flu UTD.  COVID UTD.      Lipids - remain improved.      Eye - R eye - retina not detached, but some wave to it.  Monitoring with the eye team.  Not progressing. In the peripheral portion of the R eye.  And colors little more muted.      Wt Readings from Last 6 Encounters:   04/29/25 210 lb (95.3 kg)   04/24/24 224 lb (101.6 kg)   04/19/23 227 lb (103 kg)   09/12/22 223 lb (101.2 kg)   03/10/21 233 lb 6.4 oz (105.9 kg)   03/10/20 221 lb (100.2 kg)     Body mass index is 25.73 kg/m².     Chemistry Labs:   Lab Results   Component Value Date/Time    GLU 90 04/10/2025 08:21 AM     04/10/2025 08:21 AM    K 4.2 04/10/2025 08:21 AM     04/10/2025 08:21 AM    CO2 31 04/10/2025 08:21 AM    CREATSERUM 0.99 04/10/2025 08:21 AM    CA 9.3 04/10/2025 08:21 AM    ALB 4.1 04/10/2025 08:21 AM    TP 6.9 04/10/2025 08:21 AM    ALKPHO 50 04/10/2025 08:21 AM    AST 19 04/10/2025 08:21 AM    ALT 13 04/10/2025 08:21 AM    BILT 0.6 04/10/2025 08:21 AM          Cholesterol  (most recent labs)   Lab Results   Component Value Date/Time    CHOLEST 208 (H) 04/10/2025 08:21 AM    HDL 66 04/10/2025 08:21 AM     (H) 04/10/2025 08:21 AM    TRIG 78 04/10/2025 08:21 AM      No results found for: \"PSA\"      Current Medications[1]   Past Medical History[2]   Past Surgical History[3]   Family History[4]   Social History:  Short Social Hx on File[5]     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 .   : yes. Children: 2 - daughter 42.  Son 34 (in [place])  Exercise: regular.    Diet: improved.   Hello fresh.       REVIEW OF SYSTEMS:     All systems reviewed, negative other  than noted above.    EXAM:   /68   Pulse 73   Resp 16   Ht 6' 3.75\" (1.924 m)   Wt 210 lb (95.3 kg)   SpO2 98%   BMI 25.73 kg/m²   Body mass index is 25.73 kg/m².     General appearance: alert, appears stated age and cooperative  Eyes: conjunctivae/corneas clear. PERRL, EOM's intact.   Ears: normal TM's and external ear canals both ears  Neck: no adenopathy, no JVD, supple, symmetrical, trachea midline and thyroid not enlarged, symmetric, no tenderness/mass/nodules  Lungs: clear to auscultation bilaterally  Heart: S1, S2 normal, no murmur, click, rub or gallop, regular rate and rhythm  Abdomen: soft, non-tender; bowel sounds normal; no masses,  no organomegaly  Extremities: extremities normal, atraumatic, no cyanosis or edema  Pulses: 2+ and symmetric  Neurologic: Alert and oriented X 3, normal strength and tone. Normal symmetric reflexes. Normal coordination and gait    ASSESSMENT AND PLAN:     Fito Pichardo was seen in the office today:  had concerns including Well Adult (Patient here for physical ).    1. Annual physical exam  Overall well  Healthy diet and exercise  Lipids, labs remain very well controlled  C-scope UTD  PSA normal with screening labs.  Immunizations updated.     2. Change in vision  In R eye.  Seeing ophtho - a fold or sorts in the retina.  They are monitoring regularly.   Discussed red flags, worrisome changes in vision to look out for.         Moving to [place] at the end of summer.  He has family in the area.       Sven Farrell M.D.   [facility]  04/29/25          Note to patient: The 21 Century Cures Act makes medical notes like these available to patients in the interest of transparency. However, be advised this is a medical document. It is intended as peer to peer communication. It is written in medical language and may contain abbreviations or verbiage that are unfamiliar. It may appear blunt or direct. Medical documents are intended to carry relevant  information, facts as evident, and the clinical opinion of the practitioner.            [1]   Current Outpatient Medications   Medication Sig Dispense Refill    Ciclopirox 8 % External Solution Apply 1 Application topically nightly.     [2]   Past Medical History:   Actinic keratosis    Cancer (HCC)    Dysplastic nevus    right upper back    Hearing loss    Hearing aids    Melanoma (HCC)    .42mm mid back    Melanoma in situ of back (HCC)    Squamous cell carcinoma    right forearm    Wears glasses    Reading   [3]   Past Surgical History:  Procedure Laterality Date    Colonoscopy  03/18/2013    diverticulosis    Colonoscopy     [4]   Family History  Problem Relation Age of Onset    Pacemaker Father     Ear Problems Father     Ear Problems Brother     Ear Problems Maternal Uncle     Allergies Neg     Cancer Neg     Bleeding Disorders Neg     Clotting Disorder Neg     Thyroid disease Neg     Hypertension Neg     Asthma Neg     Arthritis Neg     Diabetes Neg     Anesthesia Problems  Neg    [5]   Social History  Socioeconomic History    Marital status:    Tobacco Use    Smoking status: Never    Smokeless tobacco: Never   Vaping Use    Vaping status: Never Used   Substance and Sexual Activity    Alcohol use: Yes     Comment: 2 glasses wine and 2 cans of beer once/twice a month    Drug use: No   Other Topics Concern    Caffeine Concern No     Comment: decaf    Exercise Yes     Comment: 2-3 times weekly     Seat Belt Yes     Social Drivers of Health     Food Insecurity: No Food Insecurity (4/29/2025)    NCSS - Food Insecurity     Worried About Running Out of Food in the Last Year: No     Ran Out of Food in the Last Year: No   Transportation Needs: No Transportation Needs (4/29/2025)    NCSS - Transportation     Lack of Transportation: No   Housing Stability: Not At Risk (4/29/2025)    NCSS - Housing/Utilities     Has Housing: Yes     Worried About Losing Housing: No     Unable to Get Utilities: No

## (undated) NOTE — ED AVS SNAPSHOT
Radha Burton   MRN: [id number]    Department:  [facility] Emergency Department   Date of Visit:  12/17/2019           Disclosure     Insurance plans vary and the physician(s) referred by the ER may not be covered by your plan.  Please contact yo tell this physician (or your personal doctor if your instructions are to return to your personal doctor) about any new or lasting problems. The primary care or specialist physician will see patients referred from the [facility] Emergency Department.  Kaiden Leon